# Patient Record
Sex: FEMALE | Race: WHITE | Employment: PART TIME | URBAN - METROPOLITAN AREA
[De-identification: names, ages, dates, MRNs, and addresses within clinical notes are randomized per-mention and may not be internally consistent; named-entity substitution may affect disease eponyms.]

---
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
## 2020-12-24 ENCOUNTER — HOSPITAL ENCOUNTER (EMERGENCY)
Age: 20
Discharge: HOME | End: 2020-12-24
Attending: EMERGENCY MEDICINE

## 2020-12-24 VITALS
HEART RATE: 98 BPM | BODY MASS INDEX: 28.52 KG/M2 | RESPIRATION RATE: 15 BRPM | OXYGEN SATURATION: 98 % | WEIGHT: 155 LBS | SYSTOLIC BLOOD PRESSURE: 122 MMHG | HEIGHT: 62 IN | DIASTOLIC BLOOD PRESSURE: 82 MMHG | TEMPERATURE: 101.4 F

## 2020-12-24 LAB — S PYO AG THROAT QL: NEGATIVE

## 2020-12-24 PROCEDURE — 87880 STREP A ASSAY W/OPTIC: CPT

## 2020-12-24 PROCEDURE — 6370000000 HC RX 637 (ALT 250 FOR IP): Performed by: EMERGENCY MEDICINE

## 2020-12-24 PROCEDURE — 99285 EMERGENCY DEPT VISIT HI MDM: CPT

## 2020-12-24 PROCEDURE — 87081 CULTURE SCREEN ONLY: CPT

## 2020-12-24 RX ORDER — ACETAMINOPHEN 325 MG/1
650 TABLET ORAL ONCE
Status: COMPLETED | OUTPATIENT
Start: 2020-12-24 | End: 2020-12-24

## 2020-12-24 RX ORDER — AMOXICILLIN 875 MG/1
875 TABLET, COATED ORAL 2 TIMES DAILY
Qty: 20 TABLET | Refills: 0 | Status: SHIPPED | OUTPATIENT
Start: 2020-12-24 | End: 2021-01-03

## 2020-12-24 RX ADMIN — ACETAMINOPHEN 650 MG: 325 TABLET ORAL at 12:56

## 2020-12-24 RX ADMIN — AMOXICILLIN 750 MG: 500 CAPSULE ORAL at 12:56

## 2020-12-24 ASSESSMENT — PAIN SCALES - GENERAL
PAINLEVEL_OUTOF10: 6
PAINLEVEL_OUTOF10: 6

## 2020-12-24 ASSESSMENT — PAIN DESCRIPTION - DESCRIPTORS: DESCRIPTORS: ACHING

## 2020-12-24 ASSESSMENT — PAIN DESCRIPTION - LOCATION: LOCATION: GENERALIZED

## 2020-12-24 ASSESSMENT — PAIN DESCRIPTION - FREQUENCY: FREQUENCY: CONTINUOUS

## 2020-12-24 ASSESSMENT — PAIN DESCRIPTION - PAIN TYPE: TYPE: ACUTE PAIN

## 2020-12-24 NOTE — ED NOTES
.Reviewed discharge instructions with patient. Patient verbalized understanding. No distress noted. Ambulatory from department.      Daniel Herrera RN  12/24/20 [id number]

## 2020-12-26 NOTE — ED PROVIDER NOTES
Triage Chief Complaint:   Fever (Fever, sore throat, swollen neck, body aches, headaches, vomited x1)    [place]:  Ina Hendrickson is a 21 y.o. female that presents with sore throat, swollen neck, body aches, headaches for two days. Patient states that this feels like prior strep. Patient vomited once. She denies shortness of breath, cough, runny nose, anosmia. No COVID contacts that she is aware of.     ROS:  General:  No fevers, + chills, no weakness  Eyes:  No recent vison changes, no discharge  ENT:  + sore throat, no nasal congestion, no hearing changes  Cardiovascular:  No chest pain, no palpitations  Respiratory:  No shortness of breath, no cough, no wheezing  Gastrointestinal:  No pain, no nausea, + vomiting, no diarrhea  Musculoskeletal:  + muscle pain  Skin:  No rash,   Neurologic:  No speech problems, no headache  Genitourinary:  No dysuria  Extremities:  no edema, no pain    Past Medical History:   Diagnosis Date    Anxiety 12/3/2018     Past Surgical History:   Procedure Laterality Date    SOFT TISSUE TUMOR RESECTION Left 8/10    scalp, benign pseudolymphoma after insect bite    TYMPANOSTOMY TUBE PLACEMENT Bilateral age 3     Family History   Problem Relation Age of Onset    Diabetes Mother 28        type 2    Alcohol Abuse Maternal Aunt     Thyroid Disease Maternal Aunt     Diabetes Maternal Grandmother 61        type 2    Brain Cancer Maternal Grandmother 60         of Glioblastoma    Thyroid Disease Maternal Grandmother 36        Hypothyroid    Stroke Other 58         of CVA    Heart Disease Maternal Grandfather 48    High Cholesterol Maternal Grandfather     Lupus Paternal Grandmother 27         age 48 of Lung Problems    Clotting Disorder Paternal Grandmother 27    Heart Disease Paternal Grandfather 54    Diabetes Paternal Grandfather 48        type 2    High Cholesterol Paternal Grandfather     Mental Illness Father     Substance Abuse Father      Social History dictating provider for clarification.         Amelia Vences MD  12/25/20 [id number]

## 2020-12-27 LAB — S PYO THROAT QL CULT: NORMAL
